# Patient Record
Sex: FEMALE | Race: WHITE | NOT HISPANIC OR LATINO | Employment: OTHER | URBAN - METROPOLITAN AREA
[De-identification: names, ages, dates, MRNs, and addresses within clinical notes are randomized per-mention and may not be internally consistent; named-entity substitution may affect disease eponyms.]

---

## 2023-07-22 ENCOUNTER — OFFICE VISIT (OUTPATIENT)
Dept: URGENT CARE | Facility: CLINIC | Age: 68
End: 2023-07-22
Payer: MEDICARE

## 2023-07-22 VITALS
SYSTOLIC BLOOD PRESSURE: 120 MMHG | HEIGHT: 66 IN | OXYGEN SATURATION: 98 % | TEMPERATURE: 98 F | RESPIRATION RATE: 16 BRPM | DIASTOLIC BLOOD PRESSURE: 84 MMHG | WEIGHT: 170 LBS | BODY MASS INDEX: 27.32 KG/M2 | HEART RATE: 89 BPM

## 2023-07-22 DIAGNOSIS — N30.01 ACUTE CYSTITIS WITH HEMATURIA: Primary | ICD-10-CM

## 2023-07-22 DIAGNOSIS — R30.0 DYSURIA: ICD-10-CM

## 2023-07-22 LAB
SL AMB  POCT GLUCOSE, UA: NORMAL
SL AMB LEUKOCYTE ESTERASE,UA: NORMAL
SL AMB POCT BILIRUBIN,UA: NEGATIVE
SL AMB POCT BLOOD,UA: NORMAL
SL AMB POCT CLARITY,UA: NORMAL
SL AMB POCT COLOR,UA: NORMAL
SL AMB POCT KETONES,UA: NEGATIVE
SL AMB POCT NITRITE,UA: NEGATIVE
SL AMB POCT PH,UA: 6
SL AMB POCT SPECIFIC GRAVITY,UA: 1.02
SL AMB POCT URINE PROTEIN: NEGATIVE
SL AMB POCT UROBILINOGEN: 0.2

## 2023-07-22 PROCEDURE — 99204 OFFICE O/P NEW MOD 45 MIN: CPT | Performed by: NURSE PRACTITIONER

## 2023-07-22 PROCEDURE — G0463 HOSPITAL OUTPT CLINIC VISIT: HCPCS | Performed by: NURSE PRACTITIONER

## 2023-07-22 PROCEDURE — 87086 URINE CULTURE/COLONY COUNT: CPT | Performed by: NURSE PRACTITIONER

## 2023-07-22 PROCEDURE — 81002 URINALYSIS NONAUTO W/O SCOPE: CPT | Performed by: NURSE PRACTITIONER

## 2023-07-22 RX ORDER — METFORMIN HYDROCHLORIDE 500 MG/1
TABLET, EXTENDED RELEASE ORAL
COMMUNITY
Start: 2023-04-15

## 2023-07-22 RX ORDER — ESOMEPRAZOLE MAGNESIUM 40 MG/1
40 CAPSULE, DELAYED RELEASE ORAL
COMMUNITY
Start: 2023-03-22

## 2023-07-22 RX ORDER — CEPHALEXIN 500 MG/1
500 CAPSULE ORAL EVERY 12 HOURS SCHEDULED
Qty: 14 CAPSULE | Refills: 0 | Status: SHIPPED | OUTPATIENT
Start: 2023-07-22 | End: 2023-07-29

## 2023-07-22 RX ORDER — ALBUTEROL SULFATE 90 UG/1
AEROSOL, METERED RESPIRATORY (INHALATION)
COMMUNITY
Start: 2023-03-06

## 2023-07-22 RX ORDER — LEVOTHYROXINE SODIUM 112 UG/1
112 TABLET ORAL DAILY
COMMUNITY
Start: 2023-07-13

## 2023-07-22 RX ORDER — OMEPRAZOLE/SODIUM BICARBONATE 20MG-1.1G
40 CAPSULE ORAL DAILY
COMMUNITY

## 2023-07-22 RX ORDER — BUPROPION HYDROCHLORIDE 100 MG/1
300 TABLET ORAL
COMMUNITY
Start: 2023-04-04

## 2023-07-22 RX ORDER — NITROFURANTOIN MACROCRYSTALS 100 MG/1
CAPSULE ORAL
COMMUNITY
Start: 2023-06-08

## 2023-07-22 RX ORDER — NALTREXONE HYDROCHLORIDE 50 MG/1
50 TABLET, FILM COATED ORAL
COMMUNITY
Start: 2023-04-04

## 2023-07-22 NOTE — PATIENT INSTRUCTIONS
Your urine shows bacteria. You have been prescribed an antibiotic. You are to take all medication as prescribed. You are to avoid alcohol and caffeine - they are bladder irritants. Drink water. Take tylenol or motrin for pain or fever. You are to download [employer] for the results in 3-5 days. You will be notified if the antibiotic needs changed. Follow up with your PCP in 2-3 days. Go to the ED if symptoms worsen. You have been prescribed cephalexin.   Take as prescribed  Monitor for possible kidney stones - they can cause side pain and back pain

## 2023-07-22 NOTE — PROGRESS NOTES
[facility]        NAME: Main Toribio is a 80 y.o. female  : 1955    MRN: [id number]  DATE: 2023  TIME: 2:13 PM    Assessment and Plan   Acute cystitis with hematuria [N30.01]  1. Acute cystitis with hematuria  cephalexin (KEFLEX) 500 mg capsule      2. Dysuria  POCT urine dip    Urine culture    cephalexin (KEFLEX) 500 mg capsule            Patient Instructions       Follow up with PCP in 3-5 days. Proceed to  ER if symptoms worsen. Your urine shows bacteria. You have been prescribed an antibiotic. You are to take all medication as prescribed. You are to avoid alcohol and caffeine - they are bladder irritants. Drink water. Take tylenol or motrin for pain or fever. You are to download [employer] for the results in 3-5 days. You will be notified if the antibiotic needs changed. Follow up with your PCP in 2-3 days. Go to the ED if symptoms worsen. You have been prescribed cephalexin. Take as prescribed  Monitor for possible kidney stones - they can cause side pain and back pain       Chief Complaint     Chief Complaint   Patient presents with   • Difficulty Urinating     1 month ago, UTI was treated and was better, 3 days started feeling urine urgency with malaise and right sided back pain. Feels out of it. History of Present Illness       This is a 80year old female who states had a UTI 1 month ago and was treated with cipro w/o testing. She states she felt better but 3 days ago noted some blood, urgency, malaise, side abdominal pain and right flank pain. She denies hx of kidney stones, fevers, chills, vomiting. + nausea         Review of Systems   Review of Systems   Constitutional: Positive for fatigue. HENT: Negative. Eyes: Negative. Respiratory: Negative. Cardiovascular: Negative. Gastrointestinal: Negative. Endocrine: Negative. Genitourinary: Positive for dysuria, flank pain, hematuria, pelvic pain, urgency and vaginal pain. Skin: Negative. Allergic/Immunologic: Negative. Neurological: Negative. Hematological: Negative. Psychiatric/Behavioral: Negative. Current Medications       Current Outpatient Medications:   •  albuterol (ProAir HFA) 90 mcg/act inhaler,  2 puff(s), Inhale, TID, PRN for wheezing, # 8.5 gm, 0 Refill(s), Pharmacy: [facility] [id number], 167.64, cm, 03/06/23 14:55:00 EST, Patient Height, 84.367, kg, 03/06/23 14:55:00 EST, Patient Weight (kg), Disp: , Rfl:   •  buPROPion (WELLBUTRIN) 100 mg tablet, 300 mg, Disp: , Rfl:   •  cephalexin (KEFLEX) 500 mg capsule, Take 1 capsule (500 mg total) by mouth every 12 (twelve) hours for 7 days, Disp: 14 capsule, Rfl: 0  •  esomeprazole (NexIUM) 40 MG capsule, 40 mg, Disp: , Rfl:   •  levothyroxine 112 mcg tablet, Take 112 mcg by mouth daily, Disp: , Rfl:   •  metFORMIN (GLUCOPHAGE-XR) 500 mg 24 hr tablet, , Disp: , Rfl:   •  naltrexone (REVIA) 50 mg tablet, 50 mg, Disp: , Rfl:   •  nitrofurantoin (MACRODANTIN) 100 mg capsule, TAKE 1 CAPSULE BY MOUTH TWICE A DAY FOR 7 DAYS, Disp: , Rfl:   •  Omeprazole-Sodium Bicarbonate  MG CAPS, Take 40 mg by mouth daily, Disp: , Rfl:     Current Allergies     Allergies as of 07/22/2023 - Reviewed 07/22/2023   Allergen Reaction Noted   • Sulfa antibiotics Hives 12/19/2012            The following portions of the patient's history were reviewed and updated as appropriate: allergies, current medications, past family history, past medical history, past social history, past surgical history and problem list.     Past Medical History:   Diagnosis Date   • No pertinent past medical history        Past Surgical History:   Procedure Laterality Date   • NO PAST SURGERIES         History reviewed. No pertinent family history. Medications have been verified.         Objective   /84   Pulse 89   Temp 98 °F (36.7 °C)   Resp 16   Ht 5' 6" (1.676 m)   Wt 77.1 kg (170 lb)   SpO2 98%   BMI 27.44 kg/m²   No LMP recorded. Patient is postmenopausal.       Physical Exam     Physical Exam  Vitals and nursing note reviewed. Constitutional:       General: She is not in acute distress. Appearance: She is obese. She is not ill-appearing, toxic-appearing or diaphoretic. HENT:      Head: Normocephalic and atraumatic. Nose: Nose normal.      Mouth/Throat:      Mouth: Mucous membranes are moist.   Eyes:      Extraocular Movements: Extraocular movements intact. Cardiovascular:      Rate and Rhythm: Normal rate and regular rhythm. Pulses: Normal pulses. Heart sounds: Normal heart sounds. Pulmonary:      Effort: Pulmonary effort is normal.      Breath sounds: Normal breath sounds. Abdominal:      General: There is no distension. Palpations: Abdomen is soft. Tenderness: There is no abdominal tenderness. There is no right CVA tenderness or left CVA tenderness. Musculoskeletal:         General: Normal range of motion. Cervical back: Normal range of motion. Skin:     General: Skin is warm and dry. Capillary Refill: Capillary refill takes less than 2 seconds. Neurological:      General: No focal deficit present. Mental Status: She is alert and oriented to person, place, and time. Psychiatric:         Mood and Affect: Mood normal.         Behavior: Behavior normal.         Thought Content:  Thought content normal.         Judgment: Judgment normal.         poct urine  Small leuks large blood  Will send for urine culture

## 2023-07-23 LAB — BACTERIA UR CULT: NORMAL

## 2023-07-24 NOTE — RESULT ENCOUNTER NOTE
LM for pt that ua cx was negative for bacteria growth. Explained if the abx making her symptoms better she may complete it but needs to follow up with her PCP.

## 2025-04-20 ENCOUNTER — OFFICE VISIT (OUTPATIENT)
Dept: URGENT CARE | Age: 70
End: 2025-04-20
Payer: MEDICARE

## 2025-04-20 VITALS
TEMPERATURE: 97.6 F | OXYGEN SATURATION: 98 % | RESPIRATION RATE: 22 BRPM | SYSTOLIC BLOOD PRESSURE: 145 MMHG | HEART RATE: 75 BPM | DIASTOLIC BLOOD PRESSURE: 81 MMHG

## 2025-04-20 DIAGNOSIS — R06.2 WHEEZING: ICD-10-CM

## 2025-04-20 DIAGNOSIS — Z75.8 DOES NOT HAVE PRIMARY CARE PROVIDER: ICD-10-CM

## 2025-04-20 DIAGNOSIS — R09.81 NASAL CONGESTION: ICD-10-CM

## 2025-04-20 DIAGNOSIS — J45.31 MILD PERSISTENT REACTIVE AIRWAY DISEASE WITH ACUTE EXACERBATION (HHS-HCC): Primary | ICD-10-CM

## 2025-04-20 PROBLEM — E03.9 HYPOTHYROIDISM (ACQUIRED): Chronic | Status: ACTIVE | Noted: 2020-06-29

## 2025-04-20 PROBLEM — H40.9 GLAUCOMA: Status: ACTIVE | Noted: 2022-07-13

## 2025-04-20 PROBLEM — J44.9 CHRONIC OBSTRUCTIVE PULMONARY DISEASE (MULTI): Status: ACTIVE | Noted: 2025-04-20

## 2025-04-20 LAB
POC CORONAVIRUS SARS-COV-2 PCR: NEGATIVE
POC HUMAN RHINOVIRUS PCR: NEGATIVE
POC INFLUENZA A VIRUS PCR: NEGATIVE
POC INFLUENZA B VIRUS PCR: NEGATIVE
POC RESPIRATORY SYNCYTIAL VIRUS PCR: NEGATIVE

## 2025-04-20 PROCEDURE — 1160F RVW MEDS BY RX/DR IN RCRD: CPT | Performed by: NURSE PRACTITIONER

## 2025-04-20 PROCEDURE — 87631 RESP VIRUS 3-5 TARGETS: CPT | Performed by: NURSE PRACTITIONER

## 2025-04-20 PROCEDURE — 99203 OFFICE O/P NEW LOW 30 MIN: CPT | Performed by: NURSE PRACTITIONER

## 2025-04-20 PROCEDURE — 94640 AIRWAY INHALATION TREATMENT: CPT | Performed by: NURSE PRACTITIONER

## 2025-04-20 PROCEDURE — 1159F MED LIST DOCD IN RCRD: CPT | Performed by: NURSE PRACTITIONER

## 2025-04-20 PROCEDURE — 1036F TOBACCO NON-USER: CPT | Performed by: NURSE PRACTITIONER

## 2025-04-20 RX ORDER — IPRATROPIUM BROMIDE AND ALBUTEROL SULFATE 2.5; .5 MG/3ML; MG/3ML
3 SOLUTION RESPIRATORY (INHALATION) ONCE
Status: COMPLETED | OUTPATIENT
Start: 2025-04-20 | End: 2025-04-20

## 2025-04-20 RX ORDER — LEVOTHYROXINE SODIUM 112 UG/1
112 TABLET ORAL
COMMUNITY
Start: 2023-07-13 | End: 2025-10-29

## 2025-04-20 RX ORDER — ALBUTEROL SULFATE 90 UG/1
2 INHALANT RESPIRATORY (INHALATION) EVERY 4 HOURS PRN
COMMUNITY
Start: 2025-03-11 | End: 2025-04-20 | Stop reason: SDUPTHER

## 2025-04-20 RX ORDER — PREDNISONE 20 MG/1
40 TABLET ORAL DAILY
Qty: 10 TABLET | Refills: 0 | Status: SHIPPED | OUTPATIENT
Start: 2025-04-20 | End: 2025-04-25

## 2025-04-20 RX ORDER — BUPROPION HYDROCHLORIDE 150 MG/1
300 TABLET, EXTENDED RELEASE ORAL
COMMUNITY
Start: 2024-12-20 | End: 2025-12-20

## 2025-04-20 RX ORDER — ESOMEPRAZOLE MAGNESIUM 40 MG/1
40 CAPSULE, DELAYED RELEASE ORAL 2 TIMES DAILY
COMMUNITY
Start: 2024-10-24 | End: 2025-10-24

## 2025-04-20 RX ORDER — ALBUTEROL SULFATE 90 UG/1
2 INHALANT RESPIRATORY (INHALATION) EVERY 4 HOURS PRN
Qty: 18 G | Refills: 0 | Status: SHIPPED | OUTPATIENT
Start: 2025-04-20 | End: 2026-04-20

## 2025-04-20 RX ORDER — NALTREXONE HYDROCHLORIDE 50 MG/1
50 TABLET, FILM COATED ORAL
COMMUNITY
Start: 2024-01-11 | End: 2025-09-17

## 2025-04-20 RX ORDER — METFORMIN HYDROCHLORIDE 1000 MG/1
1000 TABLET ORAL
COMMUNITY
Start: 2024-01-11

## 2025-04-20 RX ADMIN — IPRATROPIUM BROMIDE AND ALBUTEROL SULFATE 3 ML: 2.5; .5 SOLUTION RESPIRATORY (INHALATION) at 12:51

## 2025-04-20 ASSESSMENT — ENCOUNTER SYMPTOMS
ACTIVITY CHANGE: 0
COUGH: 1
SINUS PAIN: 0
SHORTNESS OF BREATH: 1
VOMITING: 0
EYE ITCHING: 1
WHEEZING: 1
DIZZINESS: 0
NAUSEA: 0
SORE THROAT: 0
EYE DISCHARGE: 1
ABDOMINAL PAIN: 0
APPETITE CHANGE: 0
SINUS PRESSURE: 0
MYALGIAS: 0
PHOTOPHOBIA: 0
FATIGUE: 0
DIARRHEA: 0
ARTHRALGIAS: 0
HEADACHES: 0
RHINORRHEA: 1

## 2025-04-20 NOTE — PROGRESS NOTES
Subjective   Patient ID: Rachael Hernandez is a 70 y.o. female. They present today with a chief complaint of Nasal Congestion and Eye Problem.    History of Present Illness  This is a 70-year-old female who presents to the clinic today with an acute onset nasal congestion, runny nose, cough, wheezing when she lays down.  Patient states [facility] [place] [facility]  She will be moving here in June.  Patient states she was diagnosed with pneumonia in the middle of March.  She finished entire course of azithromycin and Augmentin and had her follow-up visit and symptoms have resolved.  Patient does have a history of allergies.  Pneumonia was seen on x-ray in left lingual area per patient's MyChart.      Eye Problem  Associated symptoms: discharge and itching    Associated symptoms: no headaches, no nausea, no photophobia and no vomiting        Past Medical History  Allergies as of 04/20/2025 - Reviewed 04/20/2025   Allergen Reaction Noted    Sulfa (sulfonamide antibiotics) Hives 12/19/2012       Prescriptions Prior to Admission[1]     Medical History[2]    Surgical History[3]     reports that she has never smoked. She has never used smokeless tobacco.    Review of Systems  Review of Systems   Constitutional:  Negative for activity change, appetite change and fatigue.   HENT:  Positive for congestion, rhinorrhea and sneezing. Negative for ear pain, sinus pressure, sinus pain and sore throat.    Eyes:  Positive for discharge and itching. Negative for photophobia and visual disturbance.   Respiratory:  Positive for cough, shortness of breath and wheezing.    Cardiovascular:  Negative for chest pain and leg swelling.   Gastrointestinal:  Negative for abdominal pain, diarrhea, nausea and vomiting.   Musculoskeletal:  Negative for arthralgias and myalgias.   Skin:  Negative for rash.   Neurological:  Negative for dizziness and headaches.   All other systems reviewed and are negative.                                  Objective    Vitals:    04/20/25 1218   BP: 145/81   Pulse: 75   Resp: 22   Temp: 36.4 °C (97.6 °F)   SpO2: 98%     No LMP recorded.    Physical Exam  Vitals reviewed.   Constitutional:       Appearance: Normal appearance. She is normal weight.   HENT:      Head: Normocephalic and atraumatic.      Right Ear: Tympanic membrane, ear canal and external ear normal.      Left Ear: Tympanic membrane, ear canal and external ear normal.      Nose: Congestion and rhinorrhea present.      Right Turbinates: Pale.      Left Turbinates: Pale.      Mouth/Throat:      Mouth: Mucous membranes are moist.      Pharynx: Oropharynx is clear.   Eyes:      Extraocular Movements: Extraocular movements intact.      Conjunctiva/sclera: Conjunctivae normal.      Pupils: Pupils are equal, round, and reactive to light.   Cardiovascular:      Rate and Rhythm: Normal rate and regular rhythm.      Pulses: Normal pulses.      Heart sounds: Normal heart sounds.   Pulmonary:      Effort: Pulmonary effort is normal.      Breath sounds: Wheezing present.   Musculoskeletal:         General: Normal range of motion.      Cervical back: Normal range of motion and neck supple.   Skin:     General: Skin is warm and dry.      Capillary Refill: Capillary refill takes less than 2 seconds.   Neurological:      General: No focal deficit present.      Mental Status: She is alert and oriented to person, place, and time.         Procedures    Point of Care Test & Imaging Results from this visit  Results for orders placed or performed in visit on 04/20/25   POCT SPOTFIRE R/ST Panel Mini w/COVID ([facility]) manually resulted    Specimen: Swab   Result Value Ref Range    POC Sars-Cov-2 PCR Negative Negative    POC Respiratory Syncytial Virus PCR Negative Negative    POC Influenza A Virus PCR Negative Negative    POC Influenza B Virus PCR Negative Negative    POC Human Rhinovirus PCR Negative Negative      Imaging  No results found.    Cardiology, Vascular, and  Other Imaging  No other imaging results found for the past 2 days      Diagnostic study results (if any) were reviewed by Kristin L Schoenlein, APRN-CNP.    Assessment/Plan   Allergies, medications, history, and pertinent labs/EKGs/Imaging reviewed by Kristin L Schoenlein, APRN-CNP.     Medical Decision Making  VSS, NAD, Nontoxic appearing.  Patient wanted viral testing, in office testing negative.  Physical exam as documented above.  DuoNeb administered.  Patient reported improvement.  Lungs CTA status post.    Symptoms, history, and exam are consistent with: Reactive airway.  Placed patient on prednisone and albuterol HFA.  Referral for PCP placed.  Strict ED precautions.    Differential Dx include, however, are not limited to: Antibiotic failure, pneumonia, bronchitis, bronchiolitis    I have a low suspicion for any acute pathologies requiring emergent evaluation and further workup at this time.  I believe patient is safe to discharge home with a low threshold for emergency room as discussed during visit.  We discussed close follow-up with primary care provider/pediatrician. Supportive care discussed.  Medication(s) profile of OTC and Rxed medication(s) if prescribed was (were) reviewed.  All questions answered and addressed.  Patient verbalized understanding.      Orders and Diagnoses  Diagnoses and all orders for this visit:  Mild persistent reactive airway disease with acute exacerbation ([facility])  -     predniSONE (Deltasone) 20 mg tablet; Take 2 tablets (40 mg) by mouth once daily for 5 days.  -     albuterol 90 mcg/actuation inhaler; Inhale 2 puffs every 4 hours if needed for wheezing or shortness of breath.  Nasal congestion  -     POCT SPOTFIRE R/ST Panel Mini w/COVID ([facility]) manually resulted  Wheezing  -     ipratropium-albuteroL (Duo-Neb) 0.5-2.5 mg/3 mL nebulizer solution 3 mL  -     predniSONE (Deltasone) 20 mg tablet; Take 2 tablets (40 mg) by mouth once daily for 5 days.  -     albuterol 90  mcg/actuation inhaler; Inhale 2 puffs every 4 hours if needed for wheezing or shortness of breath.  Does not have primary care provider  -     Referral to Primary Care; Future      Medical Admin Record  Administrations This Visit       ipratropium-albuteroL (Duo-Neb) 0.5-2.5 mg/3 mL nebulizer solution 3 mL       Admin Date  04/20/2025 Action  Given Dose  3 mL Route  nebulization Documented By  Lindsey Denton MA                    Patient disposition: Home    Electronically signed by Kristin L Schoenlein, APRN-CNP  2:32 PM           [1] (Not in a hospital admission)   [2] History reviewed. No pertinent past medical history.  [3] History reviewed. No pertinent surgical history.

## 2025-04-20 NOTE — PATIENT INSTRUCTIONS
Thank you for letting me care for you today.  You have been seen today for asthma-like symptoms from your allergies.  Start over-the-counter Flonase and Zyrtec  Medications as prescribed here  Please follow up with your primary care provider/pediatrician as directed.   If one is needed, please call [phone].  Please seek care in emergency room for red flags as discussed during visit.

## 2025-04-24 ENCOUNTER — OFFICE VISIT (OUTPATIENT)
Dept: URGENT CARE | Age: 70
End: 2025-04-24
Payer: MEDICARE

## 2025-04-24 VITALS
DIASTOLIC BLOOD PRESSURE: 84 MMHG | WEIGHT: 160 LBS | OXYGEN SATURATION: 94 % | HEART RATE: 81 BPM | TEMPERATURE: 97.9 F | RESPIRATION RATE: 20 BRPM | SYSTOLIC BLOOD PRESSURE: 131 MMHG

## 2025-04-24 DIAGNOSIS — R05.1 ACUTE COUGH: ICD-10-CM

## 2025-04-24 DIAGNOSIS — J06.9 URI, ACUTE: Primary | ICD-10-CM

## 2025-04-24 RX ORDER — AZITHROMYCIN 250 MG/1
TABLET, FILM COATED ORAL
Qty: 6 TABLET | Refills: 0 | Status: SHIPPED | OUTPATIENT
Start: 2025-04-24 | End: 2025-04-29

## 2025-04-24 ASSESSMENT — ENCOUNTER SYMPTOMS
SINUS PRESSURE: 1
WHEEZING: 1
SINUS COMPLAINT: 1
COUGH: 1

## 2025-04-24 NOTE — PROGRESS NOTES
Subjective   Patient ID: Rachael Hernandez is a 70 y.o. female. They present today with a chief complaint of Cough (Day 9- Cough, Chest Congestion, Phlegm is brown ), Sinus Problem, Nasal Congestion, and Eye Drainage.    History of Present Illness    Cough  Associated symptoms include wheezing.   Sinus Problem  Associated symptoms: congestion, cough and wheezing      This is a 70-year-old female presents today complaining of 1 week history of nasal and sinus congestion with associated productive cough of brown phlegm.  Patient also admits to wheezing at night.  She was evaluated on the 20th of this month and placed on prednisone and an inhaler and diagnosed with mild persistent reactive airway disease with acute exacerbation.  She denies any fever or chills.  She denies any shortness of breath or chest pain.  Past Medical History  Allergies as of 04/24/2025 - Reviewed 04/24/2025   Allergen Reaction Noted    Sulfa (sulfonamide antibiotics) Hives 12/19/2012       Prescriptions Prior to Admission[1]     Medical History[2]    Surgical History[3]     reports that she has quit smoking. Her smoking use included cigarettes. She has never used smokeless tobacco. She reports current alcohol use. She reports that she does not use drugs.    Review of Systems  Review of Systems   HENT:  Positive for congestion and sinus pressure.    Respiratory:  Positive for cough and wheezing.    All other systems reviewed and are negative.                                 Objective    Vitals:    04/24/25 0816   BP: 131/84   Pulse: 81   Resp: 20   Temp: 36.6 °C (97.9 °F)   SpO2: 94%   Weight: 72.6 kg (160 lb)     No LMP recorded (lmp unknown).    Physical Exam  The patient is awake alert oriented x 3 in no acute distress vital signs are stable.  She is afebrile.  Neck supple.  Nontoxic-appearing.  Well-hydrated.  Nares congested.  Throat nonerythematous.  TMs are intact.  EACs were patent.  No skin rash.  There was no frontal max or sinus  tenderness to percussion.  Cardiovascular is regular rate and rhythm.  Lungs are clear throughout no wheezing heard.  Procedures    Point of Care Test & Imaging Results from this visit  Results for orders placed or performed in visit on 04/24/25   POCT SPOTFIRE R/ST Panel Mini w/COVID ([facility]) manually resulted    Specimen: Swab   Result Value Ref Range    POC Sars-Cov-2 PCR Negative Negative    POC Respiratory Syncytial Virus PCR Negative Negative    POC Influenza A Virus PCR Negative Negative    POC Influenza B Virus PCR Negative Negative    POC Human Rhinovirus PCR Negative Negative      Imaging  No results found.    Cardiology, Vascular, and Other Imaging  No other imaging results found for the past 2 days      Diagnostic study results (if any) were reviewed by Yusef Landry DO.    Assessment/Plan   Allergies, medications, history, and pertinent labs/EKGs/Imaging reviewed by Yusef Landry DO.     Medical Decision Making  The patient was informed of her lab results she was reassured placed on a Z-Shubham for diagnosis of URI patient was instructed to follow-up with the family physician in 5 days if not improved.    Orders and Diagnoses  Diagnoses and all orders for this visit:  URI, acute  -     azithromycin (Zithromax) 250 mg tablet; Take 2 tabs (500 mg) by mouth today, than 1 daily for 4 days.  Acute cough  -     POCT SPOTFIRE R/ST Panel Mini w/COVID ([facility]) manually resulted      Medical Admin Record      Patient disposition: Home    Electronically signed by Yusef Landry DO  8:52 AM           [1] (Not in a hospital admission)  [2] History reviewed. No pertinent past medical history.  [3] History reviewed. No pertinent surgical history.

## 2025-07-29 ENCOUNTER — ANCILLARY PROCEDURE (OUTPATIENT)
Dept: URGENT CARE | Age: 70
End: 2025-07-29
Payer: MEDICARE

## 2025-07-29 ENCOUNTER — OFFICE VISIT (OUTPATIENT)
Dept: URGENT CARE | Age: 70
End: 2025-07-29
Payer: MEDICARE

## 2025-07-29 VITALS
RESPIRATION RATE: 18 BRPM | HEIGHT: 66 IN | SYSTOLIC BLOOD PRESSURE: 112 MMHG | TEMPERATURE: 97.9 F | OXYGEN SATURATION: 92 % | BODY MASS INDEX: 24.91 KG/M2 | WEIGHT: 155 LBS | DIASTOLIC BLOOD PRESSURE: 69 MMHG | HEART RATE: 100 BPM

## 2025-07-29 DIAGNOSIS — R05.9 COUGH, UNSPECIFIED TYPE: ICD-10-CM

## 2025-07-29 DIAGNOSIS — J40 BRONCHITIS: Primary | ICD-10-CM

## 2025-07-29 PROCEDURE — 71046 X-RAY EXAM CHEST 2 VIEWS: CPT | Performed by: EMERGENCY MEDICINE

## 2025-07-29 RX ORDER — AZITHROMYCIN 250 MG/1
TABLET, FILM COATED ORAL
Qty: 6 TABLET | Refills: 0 | Status: SHIPPED | OUTPATIENT
Start: 2025-07-29 | End: 2025-08-03

## 2025-07-29 ASSESSMENT — ENCOUNTER SYMPTOMS: COUGH: 1

## 2025-07-29 NOTE — PROGRESS NOTES
"Subjective   Patient ID: Rachael Hernandez is a 70 y.o. female. They present today with a chief complaint of Cough (Cough sneezing full sinuses for three weeks).    History of Present Illness    Cough      This is a 70-year-old white female presents today complaining of recurrent productive cough of yellow sputum for the past 3 weeks not associated with any fever or chills.  Patient also complained of some intermittent wheezing.  She denies any shortness of breath.  She denies any chest pain.  She complained of sinus congestion at the time.  Patient denies any calf tenderness or any recent travel.  Past Medical History  Allergies as of 07/29/2025 - Reviewed 07/29/2025   Allergen Reaction Noted    Sulfa (sulfonamide antibiotics) Hives 12/19/2012       Prescriptions Prior to Admission[1]     Medical History[2]    Surgical History[3]     reports that she has quit smoking. Her smoking use included cigarettes. She has never used smokeless tobacco. She reports current alcohol use. She reports that she does not use drugs.    Review of Systems  Review of Systems   Respiratory:  Positive for cough.    All other systems reviewed and are negative.                                 Objective    Vitals:    07/29/25 1403   BP: 112/69   Pulse: 100   Resp: 18   Temp: 36.6 °C (97.9 °F)   SpO2: 92%   Weight: 70.3 kg (155 lb)   Height: 1.676 m (5' 6\")     No LMP recorded.    Physical Exam  Patient is awake alert oriented x 3 in no acute distress vital signs noted.  Pulse ox 92% on room air.  Airway patent.  Nares clear.  Throat nonerythematous.  Neck supple.  Nontoxic-appearing.  Cardiovascular is regular rate and rhythm.  Lungs with harsh breath sounds in the left upper lobe.  No wheezing however was heard.  Procedures    Point of Care Test & Imaging Results from this visit  Results for orders placed or performed in visit on 07/29/25   POCT SPOTFIRE R/ST Panel Mini w/COVID ([facility]) manually resulted    Specimen: Swab   Result " Value Ref Range    POC Sars-Cov-2 PCR Negative Negative    POC Respiratory Syncytial Virus PCR Negative Negative    POC Influenza A Virus PCR Negative Negative    POC Influenza B Virus PCR Negative Negative    POC Human Rhinovirus PCR Negative Negative      Imaging  XR chest 2 views  Result Date: 7/29/2025  No focal infiltrate or pneumothorax.   MACRO: None.   Signed by: Von Foy 7/29/2025 3:06 PM Dictation workstation:   [id number]      Cardiology, Vascular, and Other Imaging  No other imaging results found for the past 2 days      Diagnostic study results (if any) were reviewed by Yusef Landry DO.    Assessment/Plan   Allergies, medications, history, and pertinent labs/EKGs/Imaging reviewed by Yusef Landry DO.     Medical Decision Making  The patient was informed of her x-ray results and lab results she was then reassured and discharged home in satisfactory condition after she was placed on a Z-Shubham.    Orders and Diagnoses  Diagnoses and all orders for this visit:  Bronchitis  -     azithromycin (Zithromax) 250 mg tablet; Take 2 tabs (500 mg) by mouth today, than 1 daily for 4 days.  Cough, unspecified type  -     XR chest 2 views; Future  -     POCT SPOTFIRE R/ST Panel Mini w/COVID ([facility]) manually resulted      Medical Admin Record      Patient disposition: Home    Electronically signed by Yusef Landry DO  3:22 PM           [1] (Not in a hospital admission)   [2] History reviewed. No pertinent past medical history.  [3] History reviewed. No pertinent surgical history.

## 2025-08-26 ENCOUNTER — OFFICE VISIT (OUTPATIENT)
Dept: URGENT CARE | Age: 70
End: 2025-08-26
Payer: MEDICARE

## 2025-08-26 VITALS
DIASTOLIC BLOOD PRESSURE: 90 MMHG | TEMPERATURE: 98.2 F | SYSTOLIC BLOOD PRESSURE: 148 MMHG | RESPIRATION RATE: 18 BRPM | HEART RATE: 74 BPM | OXYGEN SATURATION: 96 %

## 2025-08-26 DIAGNOSIS — R19.7 DIARRHEA, UNSPECIFIED TYPE: Primary | ICD-10-CM

## 2025-08-26 DIAGNOSIS — E11.43 TYPE 2 DIABETES MELLITUS WITH DIABETIC AUTONOMIC (POLY)NEUROPATHY: ICD-10-CM

## 2025-08-26 LAB
POC BILIRUBIN, URINE: NEGATIVE
POC BLOOD, URINE: NEGATIVE
POC GLUCOSE, URINE: NEGATIVE MG/DL
POC KETONES, URINE: NEGATIVE MG/DL
POC LEUKOCYTES, URINE: NEGATIVE
POC NITRITE,URINE: NEGATIVE
POC PH, URINE: 6 PH
POC PROTEIN, URINE: NEGATIVE MG/DL
POC SPECIFIC GRAVITY, URINE: 1.02
POC UROBILINOGEN, URINE: 0.2 EU/DL

## 2025-08-28 ASSESSMENT — ENCOUNTER SYMPTOMS
NAUSEA: 1
DIARRHEA: 1
VOMITING: 0